# Patient Record
Sex: FEMALE | Race: WHITE
[De-identification: names, ages, dates, MRNs, and addresses within clinical notes are randomized per-mention and may not be internally consistent; named-entity substitution may affect disease eponyms.]

---

## 2017-06-01 ENCOUNTER — HOSPITAL ENCOUNTER (OUTPATIENT)
Dept: HOSPITAL 47 - LABWHC1 | Age: 77
Discharge: HOME | End: 2017-06-01
Payer: MEDICARE

## 2017-06-01 DIAGNOSIS — E78.00: Primary | ICD-10-CM

## 2017-06-01 DIAGNOSIS — R79.9: ICD-10-CM

## 2017-06-01 DIAGNOSIS — I10: ICD-10-CM

## 2017-06-01 DIAGNOSIS — E03.9: ICD-10-CM

## 2017-06-01 LAB
ALP SERPL-CCNC: 74 U/L (ref 38–126)
ALT SERPL-CCNC: 40 U/L (ref 9–52)
ANION GAP SERPL CALC-SCNC: 11 MMOL/L
AST SERPL-CCNC: 31 U/L (ref 14–36)
BASOPHILS # BLD AUTO: 0.1 K/UL (ref 0–0.2)
BASOPHILS NFR BLD AUTO: 1 %
BUN SERPL-SCNC: 19 MG/DL (ref 7–17)
CALCIUM SPEC-MCNC: 9.6 MG/DL (ref 8.4–10.2)
CH: 30.1
CHCM: 34.3
CHLORIDE SERPL-SCNC: 106 MMOL/L (ref 98–107)
CHOLEST SERPL-MCNC: 194 MG/DL (ref ?–200)
CO2 SERPL-SCNC: 27 MMOL/L (ref 22–30)
EOSINOPHIL # BLD AUTO: 0.2 K/UL (ref 0–0.7)
EOSINOPHIL NFR BLD AUTO: 4 %
ERYTHROCYTE [DISTWIDTH] IN BLOOD BY AUTOMATED COUNT: 4.67 M/UL (ref 3.8–5.4)
ERYTHROCYTE [DISTWIDTH] IN BLOOD: 13.8 % (ref 11.5–15.5)
GLUCOSE SERPL-MCNC: 100 MG/DL (ref 74–99)
HCT VFR BLD AUTO: 41.3 % (ref 34–46)
HDLC SERPL-MCNC: 66 MG/DL (ref 40–60)
HDW: 2.8
HEMOGLOBIN A1C: 6 % (ref 4.2–6.1)
HGB BLD-MCNC: 14 GM/DL (ref 11.4–16)
LUC NFR BLD AUTO: 4 %
LYMPHOCYTES # SPEC AUTO: 2.1 K/UL (ref 1–4.8)
LYMPHOCYTES NFR SPEC AUTO: 31 %
MCH RBC QN AUTO: 29.9 PG (ref 25–35)
MCHC RBC AUTO-ENTMCNC: 33.9 G/DL (ref 31–37)
MCV RBC AUTO: 88.3 FL (ref 80–100)
MONOCYTES # BLD AUTO: 0.5 K/UL (ref 0–1)
MONOCYTES NFR BLD AUTO: 8 %
NEUTROPHILS # BLD AUTO: 3.6 K/UL (ref 1.3–7.7)
NEUTROPHILS NFR BLD AUTO: 53 %
NON-AFRICAN AMERICAN GFR(MDRD): >60
POTASSIUM SERPL-SCNC: 4 MMOL/L (ref 3.5–5.1)
PROT SERPL-MCNC: 7.2 G/DL (ref 6.3–8.2)
SODIUM SERPL-SCNC: 144 MMOL/L (ref 137–145)
TRIGL SERPL-MCNC: 209 MG/DL (ref ?–150)
WBC # BLD AUTO: 0.26 10*3/UL
WBC # BLD AUTO: 6.8 K/UL (ref 3.8–10.6)
WBC (PEROX): 7.21

## 2017-06-01 PROCEDURE — 80053 COMPREHEN METABOLIC PANEL: CPT

## 2017-06-01 PROCEDURE — 84439 ASSAY OF FREE THYROXINE: CPT

## 2017-06-01 PROCEDURE — 85025 COMPLETE CBC W/AUTO DIFF WBC: CPT

## 2017-06-01 PROCEDURE — 80061 LIPID PANEL: CPT

## 2017-06-01 PROCEDURE — 36415 COLL VENOUS BLD VENIPUNCTURE: CPT

## 2017-06-01 PROCEDURE — 84443 ASSAY THYROID STIM HORMONE: CPT

## 2017-06-01 PROCEDURE — 83036 HEMOGLOBIN GLYCOSYLATED A1C: CPT

## 2017-11-07 ENCOUNTER — HOSPITAL ENCOUNTER (OUTPATIENT)
Dept: HOSPITAL 47 - LABWHC1 | Age: 77
Discharge: HOME | End: 2017-11-07
Payer: MEDICARE

## 2017-11-07 DIAGNOSIS — E03.9: ICD-10-CM

## 2017-11-07 DIAGNOSIS — E78.00: ICD-10-CM

## 2017-11-07 DIAGNOSIS — R79.9: ICD-10-CM

## 2017-11-07 DIAGNOSIS — I10: Primary | ICD-10-CM

## 2017-11-07 LAB
ALP SERPL-CCNC: 95 U/L (ref 38–126)
ALT SERPL-CCNC: 52 U/L (ref 9–52)
ANION GAP SERPL CALC-SCNC: 11 MMOL/L
AST SERPL-CCNC: 35 U/L (ref 14–36)
BASOPHILS # BLD AUTO: 0.1 K/UL (ref 0–0.2)
BASOPHILS NFR BLD AUTO: 1 %
BUN SERPL-SCNC: 18 MG/DL (ref 7–17)
CALCIUM SPEC-MCNC: 9.9 MG/DL (ref 8.4–10.2)
CH: 29.1
CHCM: 32.3
CHLORIDE SERPL-SCNC: 106 MMOL/L (ref 98–107)
CHOLEST SERPL-MCNC: 261 MG/DL (ref ?–200)
CO2 SERPL-SCNC: 26 MMOL/L (ref 22–30)
EOSINOPHIL # BLD AUTO: 0.3 K/UL (ref 0–0.7)
EOSINOPHIL NFR BLD AUTO: 4 %
ERYTHROCYTE [DISTWIDTH] IN BLOOD BY AUTOMATED COUNT: 5.05 M/UL (ref 3.8–5.4)
ERYTHROCYTE [DISTWIDTH] IN BLOOD: 14.7 % (ref 11.5–15.5)
GLUCOSE SERPL-MCNC: 113 MG/DL (ref 74–99)
HCT VFR BLD AUTO: 45.8 % (ref 34–46)
HDLC SERPL-MCNC: 66 MG/DL (ref 40–60)
HDW: 2.53
HGB BLD-MCNC: 14.8 GM/DL (ref 11.4–16)
LUC NFR BLD AUTO: 4 %
LYMPHOCYTES # SPEC AUTO: 2.5 K/UL (ref 1–4.8)
LYMPHOCYTES NFR SPEC AUTO: 31 %
MCH RBC QN AUTO: 29.3 PG (ref 25–35)
MCHC RBC AUTO-ENTMCNC: 32.3 G/DL (ref 31–37)
MCV RBC AUTO: 90.7 FL (ref 80–100)
MONOCYTES # BLD AUTO: 0.7 K/UL (ref 0–1)
MONOCYTES NFR BLD AUTO: 9 %
NEUTROPHILS # BLD AUTO: 4.3 K/UL (ref 1.3–7.7)
NEUTROPHILS NFR BLD AUTO: 52 %
NON-AFRICAN AMERICAN GFR(MDRD): >60
POTASSIUM SERPL-SCNC: 4.2 MMOL/L (ref 3.5–5.1)
PROT SERPL-MCNC: 7.7 G/DL (ref 6.3–8.2)
SODIUM SERPL-SCNC: 143 MMOL/L (ref 137–145)
WBC # BLD AUTO: 0.31 10*3/UL
WBC # BLD AUTO: 8.2 K/UL (ref 3.8–10.6)
WBC (PEROX): 7.87

## 2017-11-07 PROCEDURE — 80053 COMPREHEN METABOLIC PANEL: CPT

## 2017-11-07 PROCEDURE — 83036 HEMOGLOBIN GLYCOSYLATED A1C: CPT

## 2017-11-07 PROCEDURE — 84439 ASSAY OF FREE THYROXINE: CPT

## 2017-11-07 PROCEDURE — 84443 ASSAY THYROID STIM HORMONE: CPT

## 2017-11-07 PROCEDURE — 36415 COLL VENOUS BLD VENIPUNCTURE: CPT

## 2017-11-07 PROCEDURE — 85025 COMPLETE CBC W/AUTO DIFF WBC: CPT

## 2017-11-07 PROCEDURE — 80061 LIPID PANEL: CPT

## 2018-05-09 ENCOUNTER — HOSPITAL ENCOUNTER (OUTPATIENT)
Dept: HOSPITAL 47 - LABWHC1 | Age: 78
Discharge: HOME | End: 2018-05-09
Payer: MEDICARE

## 2018-05-09 DIAGNOSIS — R79.9: ICD-10-CM

## 2018-05-09 DIAGNOSIS — E78.00: Primary | ICD-10-CM

## 2018-05-09 DIAGNOSIS — E03.9: ICD-10-CM

## 2018-05-09 LAB
ALBUMIN SERPL-MCNC: 5 G/DL (ref 3.5–5)
ALP SERPL-CCNC: 79 U/L (ref 38–126)
ALT SERPL-CCNC: 36 U/L (ref 9–52)
ANION GAP SERPL CALC-SCNC: 16 MMOL/L
AST SERPL-CCNC: 29 U/L (ref 14–36)
BASOPHILS # BLD AUTO: 0.1 K/UL (ref 0–0.2)
BASOPHILS NFR BLD AUTO: 1 %
BUN SERPL-SCNC: 18 MG/DL (ref 7–17)
CALCIUM SPEC-MCNC: 10 MG/DL (ref 8.4–10.2)
CHLORIDE SERPL-SCNC: 104 MMOL/L (ref 98–107)
CHOLEST SERPL-MCNC: 225 MG/DL (ref ?–200)
CO2 SERPL-SCNC: 26 MMOL/L (ref 22–30)
EOSINOPHIL # BLD AUTO: 0.3 K/UL (ref 0–0.7)
EOSINOPHIL NFR BLD AUTO: 4 %
ERYTHROCYTE [DISTWIDTH] IN BLOOD BY AUTOMATED COUNT: 4.98 M/UL (ref 3.8–5.4)
ERYTHROCYTE [DISTWIDTH] IN BLOOD: 13.7 % (ref 11.5–15.5)
GLUCOSE SERPL-MCNC: 102 MG/DL (ref 74–99)
HBA1C MFR BLD: 6 % (ref 4–6)
HCT VFR BLD AUTO: 43.3 % (ref 34–46)
HDLC SERPL-MCNC: 71 MG/DL (ref 40–60)
HGB BLD-MCNC: 14.5 GM/DL (ref 11.4–16)
LDLC SERPL CALC-MCNC: 132 MG/DL (ref 0–99)
LYMPHOCYTES # SPEC AUTO: 2 K/UL (ref 1–4.8)
LYMPHOCYTES NFR SPEC AUTO: 23 %
MCH RBC QN AUTO: 29.1 PG (ref 25–35)
MCHC RBC AUTO-ENTMCNC: 33.5 G/DL (ref 31–37)
MCV RBC AUTO: 86.9 FL (ref 80–100)
MONOCYTES # BLD AUTO: 0.7 K/UL (ref 0–1)
MONOCYTES NFR BLD AUTO: 8 %
NEUTROPHILS # BLD AUTO: 5.4 K/UL (ref 1.3–7.7)
NEUTROPHILS NFR BLD AUTO: 62 %
PLATELET # BLD AUTO: 284 K/UL (ref 150–450)
POTASSIUM SERPL-SCNC: 4.6 MMOL/L (ref 3.5–5.1)
PROT SERPL-MCNC: 7.6 G/DL (ref 6.3–8.2)
SODIUM SERPL-SCNC: 146 MMOL/L (ref 137–145)
T4 FREE SERPL-MCNC: 1 NG/DL (ref 0.78–2.19)
TRIGL SERPL-MCNC: 111 MG/DL (ref ?–150)
WBC # BLD AUTO: 8.7 K/UL (ref 3.8–10.6)

## 2018-05-09 PROCEDURE — 80053 COMPREHEN METABOLIC PANEL: CPT

## 2018-05-09 PROCEDURE — 84439 ASSAY OF FREE THYROXINE: CPT

## 2018-05-09 PROCEDURE — 84443 ASSAY THYROID STIM HORMONE: CPT

## 2018-05-09 PROCEDURE — 83036 HEMOGLOBIN GLYCOSYLATED A1C: CPT

## 2018-05-09 PROCEDURE — 85025 COMPLETE CBC W/AUTO DIFF WBC: CPT

## 2018-05-09 PROCEDURE — 80061 LIPID PANEL: CPT

## 2018-05-09 PROCEDURE — 36415 COLL VENOUS BLD VENIPUNCTURE: CPT

## 2018-06-07 ENCOUNTER — HOSPITAL ENCOUNTER (OUTPATIENT)
Dept: HOSPITAL 47 - RADMAMWWP | Age: 78
Discharge: HOME | End: 2018-06-07
Payer: MEDICARE

## 2018-06-07 DIAGNOSIS — Z12.31: Primary | ICD-10-CM

## 2018-06-07 PROCEDURE — 77063 BREAST TOMOSYNTHESIS BI: CPT

## 2018-06-07 PROCEDURE — 77067 SCR MAMMO BI INCL CAD: CPT

## 2018-06-11 NOTE — MM
Reason for exam: screening  (asymptomatic).

Last mammogram was performed 1 year and 6 months ago.



History:

Patient is postmenopausal and has history of other cancer at age 62.

Took estrogen for 16 years beginning at age 47.  Took progesterone for 16 years 

beginning at age 47.



Physical Findings:

A clinical breast exam by your physician is recommended on an annual basis and 

results should be correlated with mammographic findings.



MG 3D Screening Mammo W/Cad

Bilateral CC and MLO view(s) were taken.

Prior study comparison: December 16, 2016, bilateral MG 3d screening mammo w/cad. 

June 2, 2015, bilateral MG screening mammo w CAD.

There are scattered fibroglandular densities.  No suspicious abnormality.  No 

significant changes when compared with prior studies.





ASSESSMENT: Negative, BI-RAD 1



RECOMMENDATION:

Routine screening mammogram of both breasts in 1 year.

## 2019-09-19 ENCOUNTER — HOSPITAL ENCOUNTER (OUTPATIENT)
Dept: HOSPITAL 47 - RADMAMWWP | Age: 79
Discharge: HOME | End: 2019-09-19
Attending: INTERNAL MEDICINE
Payer: MEDICARE

## 2019-09-19 DIAGNOSIS — Z78.0: ICD-10-CM

## 2019-09-19 DIAGNOSIS — Z12.31: Primary | ICD-10-CM

## 2019-09-19 PROCEDURE — 77067 SCR MAMMO BI INCL CAD: CPT

## 2019-09-19 PROCEDURE — 77063 BREAST TOMOSYNTHESIS BI: CPT

## 2019-09-19 PROCEDURE — 77080 DXA BONE DENSITY AXIAL: CPT

## 2019-09-20 NOTE — BD
EXAMINATION TYPE: Axial Bone Density

 

DATE OF EXAM: 9/19/2019

 

COMPARISON: NONE

 

CLINICAL HISTORY:

 

Height:  5 FT 3 IN

Weight:  140

 

FRAX RISK QUESTIONS:

 

History of Fracture in Adulthood: YES

Secondary Osteoporosis:

    RISK FACTORS 

HISTORY OF: 

Active: YES

 

Postmenopausal woman: TOTAL HYST AGE 52

Take estrogen and/or progesterone medications: TOOK FROM AGE [age]

 

MEDICATIONS: 

Thyroid Medications:  YES

Which medication: LEVOTHYROXINE

How Long: HAS TAKEN SINCE HER 20'S

Additional Medications: AMLODIPINE, DONEPEZIL, LEVOTHYROXINE ,ATORVASTATIN, CLONIDINE, 

 

 

Additional History:

 

 

EXAM MEASUREMENTS: 

Bone mineral densitometry was performed using the [employer].

Bone mineral density as measured about the Lumbar spine is:  

----- L1-L4(G/cm2): 1.321

T Score Values are as follows:

----- L2: 0.7

----- L3: 1.7

----- L4: 2.1

----- L1-L4: 1.2

Bone mineral density has: DECREASED  -2.8 % since study of: 2016

 

Bone mineral density about the R hip (g/cm2): 0.954

Bone mineral density about the L hip (g/cm2): 0.967

T Score values are as follows:

-----R Neck: -0.6

-----L Neck: -0.5

-----R Total: 0.1

-----L Total: 0.6

Bone mineral density has: DECREASED  -2.0 % since study of: 2016

 

 

IMPRESSION:

Normal (Values between +1 and -1 indicate normal bone mass).  Consider repeating this study in 5 year
s or sooner if there is some new clinical indication.

 

 

 

 

 

NOTE:  T-SCORE=SD OF THE YOUNG ADULT MEAN.

## 2019-09-23 NOTE — MM
Reason for exam: screening  (asymptomatic).

Last mammogram was performed 1 year and 3 months ago.



History:

Patient is postmenopausal and has history of other cancer at age 62.

Took estrogen for 16 years beginning at age 47.  Took progesterone for 16 years 

beginning at age 47.



Physical Findings:

A clinical breast exam by your physician is recommended on an annual basis and 

results should be correlated with mammographic findings.



MG 3D Screening Mammo W/Cad

Bilateral CC and MLO view(s) were taken.

Prior study comparison: June 7, 2018, bilateral MG 3d screening mammo w/cad.  

December 16, 2016, bilateral MG 3d screening mammo w/cad.

There are scattered fibroglandular densities.  No significant changes when 

compared with prior studies.





ASSESSMENT: Benign, BI-RAD 2



RECOMMENDATION:

Routine screening mammogram of both breasts in 1 year.

## 2020-07-02 ENCOUNTER — HOSPITAL ENCOUNTER (INPATIENT)
Dept: HOSPITAL 47 - EC | Age: 80
LOS: 1 days | Discharge: HOME | DRG: 605 | End: 2020-07-03
Payer: COMMERCIAL

## 2020-07-02 DIAGNOSIS — Z90.49: ICD-10-CM

## 2020-07-02 DIAGNOSIS — Z90.710: ICD-10-CM

## 2020-07-02 DIAGNOSIS — Z81.8: ICD-10-CM

## 2020-07-02 DIAGNOSIS — Z79.890: ICD-10-CM

## 2020-07-02 DIAGNOSIS — Z11.59: ICD-10-CM

## 2020-07-02 DIAGNOSIS — F03.90: ICD-10-CM

## 2020-07-02 DIAGNOSIS — R26.81: ICD-10-CM
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
## 2020-07-02 DIAGNOSIS — Z60.2: ICD-10-CM
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
## 2020-07-02 DIAGNOSIS — D62: ICD-10-CM

## 2020-07-02 DIAGNOSIS — I10: ICD-10-CM

## 2020-07-02 DIAGNOSIS — Z98.890: ICD-10-CM

## 2020-07-02 DIAGNOSIS — Z79.899: ICD-10-CM

## 2020-07-02 DIAGNOSIS — E78.5: ICD-10-CM

## 2020-07-02 DIAGNOSIS — S20.01XA: Primary | ICD-10-CM

## 2020-07-02 LAB
ALBUMIN SERPL-MCNC: 4.4 G/DL (ref 3.5–5)
ALP SERPL-CCNC: 82 U/L (ref 38–126)
ALT SERPL-CCNC: 50 U/L (ref 4–34)
ANION GAP SERPL CALC-SCNC: 10 MMOL/L
AST SERPL-CCNC: 61 U/L (ref 14–36)
BASOPHILS # BLD AUTO: 0.1 K/UL (ref 0–0.2)
BASOPHILS NFR BLD AUTO: 1 %
BUN SERPL-SCNC: 16 MG/DL (ref 7–17)
CALCIUM SPEC-MCNC: 10.2 MG/DL (ref 8.4–10.2)
CHLORIDE SERPL-SCNC: 106 MMOL/L (ref 98–107)
CO2 SERPL-SCNC: 21 MMOL/L (ref 22–30)
EOSINOPHIL # BLD AUTO: 0.1 K/UL (ref 0–0.7)
EOSINOPHIL NFR BLD AUTO: 2 %
ERYTHROCYTE [DISTWIDTH] IN BLOOD BY AUTOMATED COUNT: 4.58 M/UL (ref 3.8–5.4)
ERYTHROCYTE [DISTWIDTH] IN BLOOD: 13.9 % (ref 11.5–15.5)
GLUCOSE SERPL-MCNC: 144 MG/DL (ref 74–99)
HCT VFR BLD AUTO: 40.7 % (ref 34–46)
HGB BLD-MCNC: 13.4 GM/DL (ref 11.4–16)
LYMPHOCYTES # SPEC AUTO: 1.2 K/UL (ref 1–4.8)
LYMPHOCYTES NFR SPEC AUTO: 14 %
MCH RBC QN AUTO: 29.2 PG (ref 25–35)
MCHC RBC AUTO-ENTMCNC: 32.8 G/DL (ref 31–37)
MCV RBC AUTO: 88.8 FL (ref 80–100)
MONOCYTES # BLD AUTO: 0.6 K/UL (ref 0–1)
MONOCYTES NFR BLD AUTO: 7 %
NEUTROPHILS # BLD AUTO: 6.8 K/UL (ref 1.3–7.7)
NEUTROPHILS NFR BLD AUTO: 76 %
PLATELET # BLD AUTO: 230 K/UL (ref 150–450)
POTASSIUM SERPL-SCNC: 4 MMOL/L (ref 3.5–5.1)
PROT SERPL-MCNC: 7.1 G/DL (ref 6.3–8.2)
SODIUM SERPL-SCNC: 137 MMOL/L (ref 137–145)
WBC # BLD AUTO: 9 K/UL (ref 3.8–10.6)

## 2020-07-02 PROCEDURE — 90715 TDAP VACCINE 7 YRS/> IM: CPT

## 2020-07-02 PROCEDURE — 71260 CT THORAX DX C+: CPT

## 2020-07-02 PROCEDURE — 36415 COLL VENOUS BLD VENIPUNCTURE: CPT

## 2020-07-02 PROCEDURE — 85025 COMPLETE CBC W/AUTO DIFF WBC: CPT

## 2020-07-02 PROCEDURE — 99285 EMERGENCY DEPT VISIT HI MDM: CPT

## 2020-07-02 PROCEDURE — 84484 ASSAY OF TROPONIN QUANT: CPT

## 2020-07-02 PROCEDURE — 70450 CT HEAD/BRAIN W/O DYE: CPT

## 2020-07-02 PROCEDURE — 90471 IMMUNIZATION ADMIN: CPT

## 2020-07-02 PROCEDURE — 93005 ELECTROCARDIOGRAM TRACING: CPT

## 2020-07-02 PROCEDURE — 74177 CT ABD & PELVIS W/CONTRAST: CPT

## 2020-07-02 PROCEDURE — 96374 THER/PROPH/DIAG INJ IV PUSH: CPT

## 2020-07-02 PROCEDURE — 80053 COMPREHEN METABOLIC PANEL: CPT

## 2020-07-02 PROCEDURE — 72125 CT NECK SPINE W/O DYE: CPT

## 2020-07-02 RX ADMIN — HYDROCODONE BITARTRATE AND ACETAMINOPHEN PRN EACH: 5; 325 TABLET ORAL at 19:41

## 2020-07-02 SDOH — SOCIAL STABILITY - SOCIAL INSECURITY: PROBLEMS RELATED TO LIVING ALONE: Z60.2
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
## 2020-07-02 NOTE — XR
Left knee

 

HISTORY: Trauma and pain

 

4 views the left knee

 

Osteoarthritic changes are present, marginal spurring is present in the medial compartment with joint
 space loss greater than at the patellofemoral joint. There is soft tissue swelling. No evident joint
 effusion. Alignment, bone mineralization are maintained. Vascular calcifications are noted incidenta
lly.

 

IMPRESSION: Soft tissue swelling. Osteophytes. No fracture or dislocation.
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
## 2020-07-02 NOTE — XR
Left forearm and left wrist

 

HISTORY: Trauma and pain

 

2 views of the left, 3 views the left wrist

 

Osteoarthritic changes are present especially at the carpometacarpal joint, first digit of the left h
and. Bone mineralization is reduced. Alignment is maintained. Intravenous catheter present at the ant
ecubital region.

 

IMPRESSION: No fracture or dislocation of the left wrist or forearm.
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
## 2020-07-03 VITALS — DIASTOLIC BLOOD PRESSURE: 52 MMHG | SYSTOLIC BLOOD PRESSURE: 127 MMHG | HEART RATE: 117 BPM | TEMPERATURE: 98.1 F

## 2020-07-03 VITALS — RESPIRATION RATE: 16 BRPM

## 2020-07-03 LAB
ALBUMIN SERPL-MCNC: 3.5 G/DL (ref 3.5–5)
ALP SERPL-CCNC: 57 U/L (ref 38–126)
ALT SERPL-CCNC: 38 U/L (ref 4–34)
ANION GAP SERPL CALC-SCNC: 5 MMOL/L
AST SERPL-CCNC: 40 U/L (ref 14–36)
BASOPHILS # BLD AUTO: 0 K/UL (ref 0–0.2)
BASOPHILS NFR BLD AUTO: 1 %
BUN SERPL-SCNC: 16 MG/DL (ref 7–17)
CALCIUM SPEC-MCNC: 8.9 MG/DL (ref 8.4–10.2)
CHLORIDE SERPL-SCNC: 107 MMOL/L (ref 98–107)
CO2 SERPL-SCNC: 24 MMOL/L (ref 22–30)
EOSINOPHIL # BLD AUTO: 0.1 K/UL (ref 0–0.7)
EOSINOPHIL NFR BLD AUTO: 1 %
ERYTHROCYTE [DISTWIDTH] IN BLOOD BY AUTOMATED COUNT: 3.42 M/UL (ref 3.8–5.4)
ERYTHROCYTE [DISTWIDTH] IN BLOOD: 14.1 % (ref 11.5–15.5)
GLUCOSE SERPL-MCNC: 110 MG/DL (ref 74–99)
HCT VFR BLD AUTO: 30.8 % (ref 34–46)
HGB BLD-MCNC: 10 GM/DL (ref 11.4–16)
LYMPHOCYTES # SPEC AUTO: 1.7 K/UL (ref 1–4.8)
LYMPHOCYTES NFR SPEC AUTO: 21 %
MCH RBC QN AUTO: 29.2 PG (ref 25–35)
MCHC RBC AUTO-ENTMCNC: 32.4 G/DL (ref 31–37)
MCV RBC AUTO: 90.1 FL (ref 80–100)
MONOCYTES # BLD AUTO: 0.9 K/UL (ref 0–1)
MONOCYTES NFR BLD AUTO: 11 %
NEUTROPHILS # BLD AUTO: 5 K/UL (ref 1.3–7.7)
NEUTROPHILS NFR BLD AUTO: 64 %
PLATELET # BLD AUTO: 190 K/UL (ref 150–450)
POTASSIUM SERPL-SCNC: 4.5 MMOL/L (ref 3.5–5.1)
PROT SERPL-MCNC: 5.9 G/DL (ref 6.3–8.2)
SODIUM SERPL-SCNC: 136 MMOL/L (ref 137–145)
WBC # BLD AUTO: 7.9 K/UL (ref 3.8–10.6)

## 2020-07-03 RX ADMIN — HYDROCODONE BITARTRATE AND ACETAMINOPHEN PRN EACH: 5; 325 TABLET ORAL at 03:07
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
## 2020-09-04 ENCOUNTER — HOSPITAL ENCOUNTER (OUTPATIENT)
Dept: HOSPITAL 47 - WWCWWP | Age: 80
Discharge: HOME | End: 2020-09-04
Attending: SURGERY
Payer: COMMERCIAL

## 2020-09-04 VITALS
DIASTOLIC BLOOD PRESSURE: 65 MMHG | TEMPERATURE: 98.4 F | SYSTOLIC BLOOD PRESSURE: 120 MMHG | RESPIRATION RATE: 16 BRPM | HEART RATE: 82 BPM

## 2020-09-04 DIAGNOSIS — Z53.9: Primary | ICD-10-CM
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
## 2020-09-08 ENCOUNTER — HOSPITAL ENCOUNTER (OUTPATIENT)
Dept: HOSPITAL 47 - OR | Age: 80
Discharge: HOME | End: 2020-09-08
Attending: SURGERY
Payer: MEDICARE

## 2020-09-08 VITALS — SYSTOLIC BLOOD PRESSURE: 155 MMHG | DIASTOLIC BLOOD PRESSURE: 69 MMHG | HEART RATE: 82 BPM

## 2020-09-08 VITALS — TEMPERATURE: 98.1 F

## 2020-09-08 VITALS — BODY MASS INDEX: 23.2 KG/M2

## 2020-09-08 VITALS — RESPIRATION RATE: 16 BRPM

## 2020-09-08 DIAGNOSIS — E07.9: ICD-10-CM

## 2020-09-08 DIAGNOSIS — Z79.890: ICD-10-CM

## 2020-09-08 DIAGNOSIS — F03.90: ICD-10-CM

## 2020-09-08 DIAGNOSIS — I10: ICD-10-CM

## 2020-09-08 DIAGNOSIS — Z79.899: ICD-10-CM
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
## 2020-09-08 DIAGNOSIS — Z90.49: ICD-10-CM

## 2020-09-08 DIAGNOSIS — Z98.890: ICD-10-CM

## 2020-09-08 DIAGNOSIS — Z79.82: ICD-10-CM

## 2020-09-08 DIAGNOSIS — S20.01XA: Primary | ICD-10-CM

## 2020-09-08 PROCEDURE — 87070 CULTURE OTHR SPECIMN AEROBIC: CPT

## 2020-09-08 PROCEDURE — 10140 I&D HMTMA SEROMA/FLUID COLLJ: CPT

## 2020-09-08 PROCEDURE — 87075 CULTR BACTERIA EXCEPT BLOOD: CPT

## 2020-09-08 PROCEDURE — 87205 SMEAR GRAM STAIN: CPT
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
## 2020-09-08 NOTE — P.DS
Providers


Attending physician: 


Allyson Hurst





Primary care physician: 


Kit Patrick








Plan - Discharge Summary


Discharge Rx Participant: No


New Discharge Prescriptions: 


No Action


   Melatonin 3 mg PO HS


   Multivit-Min/Iron/Folic/Lutein [Centrum Silver Women Tablet] 1 tab PO QAM


   Loratadine 10 mg PO QAM


   Ubidecarenone [Co Q-10] 200 mg PO QAM


   Glucosam/Vaibhav-Msm1/C/Gonzales/Bosw [Glucosamine-Chondroitin-MSM Tb] 1 tab PO BID


   Calcium Carbonate/Vitamin D3 [Calcium 600-Vit D3 400 Tablet] 1 tab PO QAM


   Donepezil [Aricept] 10 mg PO HS


   cloNIDine HCL [Catapres] 0.1 mg PO HS


   Levothyroxine Sodium [Synthroid] 50 mcg PO DAILY@0300


   Atorvastatin [Lipitor] 20 mg PO HS


   Lisinopril/Hydrochlorothiazide [Zestoretic 20-12.5] 1 tab PO QAM


   Vitamin B Complex 1 each PO BID


   Aspirin [Adult Low Dose Aspirin EC] 81 mg PO HS


   Omega-3 Fatty Acids [Omega-3] 1,600 mg PO BID


Discharge Medication List





Atorvastatin [Lipitor] 20 mg PO HS 07/02/20 [History]


Calcium Carbonate/Vitamin D3 [Calcium 600-Vit D3 400 Tablet] 1 tab PO QAM 

07/02/20 [History]


Donepezil [Aricept] 10 mg PO HS 07/02/20 [History]


Glucosam/Vaibhav-Msm1/C/Gonzales/Bosw [Glucosamine-Chondroitin-MSM Tb] 1 tab PO BID 

07/02/20 [History]


Levothyroxine Sodium [Synthroid] 50 mcg PO DAILY@0300 07/02/20 [History]


Loratadine 10 mg PO QAM 07/02/20 [History]


Melatonin 3 mg PO HS 07/02/20 [History]


Multivit-Min/Iron/Folic/Lutein [Centrum Silver Women Tablet] 1 tab PO QAM 

07/02/20 [History]


Ubidecarenone [Co Q-10] 200 mg PO QAM 07/02/20 [History]


cloNIDine HCL [Catapres] 0.1 mg PO HS 07/02/20 [History]


Aspirin [Adult Low Dose Aspirin EC] 81 mg PO HS 09/04/20 [History]


Lisinopril/Hydrochlorothiazide [Zestoretic 20-12.5] 1 tab PO QAM 09/04/20 

[History]


Omega-3 Fatty Acids [Omega-3] 1,600 mg PO BID 09/04/20 [History]


Vitamin B Complex 1 each PO BID 09/04/20 [History]








Follow up Appointment(s)/Referral(s): 


Allyson Hurst MD [STAFF PHYSICIAN] - 1 Week


Activity/Diet/Wound Care/Special Instructions: 


change wound packing Q day; daughtr in law is a nurse and will change packing; 

teach wound care


may shower after 48 hours


do not drive


Discharge Disposition: HOME SELF-CARE


Plan of Treatment: 


daughter in law is  nurse and will change packing

## 2020-09-17 ENCOUNTER — HOSPITAL ENCOUNTER (OUTPATIENT)
Dept: HOSPITAL 47 - WWCWWP | Age: 80
Discharge: HOME | End: 2020-09-17
Attending: SURGERY
Payer: MEDICARE

## 2020-09-17 VITALS
SYSTOLIC BLOOD PRESSURE: 152 MMHG | HEART RATE: 84 BPM | TEMPERATURE: 98.4 F | DIASTOLIC BLOOD PRESSURE: 88 MMHG | RESPIRATION RATE: 20 BRPM

## 2020-09-17 DIAGNOSIS — Z53.9: Primary | ICD-10-CM
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
## 2020-10-23 ENCOUNTER — HOSPITAL ENCOUNTER (OUTPATIENT)
Dept: HOSPITAL 47 - WWCWWP | Age: 80
Discharge: HOME | End: 2020-10-23
Attending: SURGERY
Payer: MEDICARE

## 2020-10-23 VITALS
DIASTOLIC BLOOD PRESSURE: 76 MMHG | SYSTOLIC BLOOD PRESSURE: 146 MMHG | HEART RATE: 91 BPM | RESPIRATION RATE: 16 BRPM | TEMPERATURE: 98.8 F

## 2020-10-23 DIAGNOSIS — Z53.9: Primary | ICD-10-CM

## 2020-11-20 ENCOUNTER — HOSPITAL ENCOUNTER (OUTPATIENT)
Dept: HOSPITAL 47 - WWCWWP | Age: 80
Discharge: HOME | End: 2020-11-20
Attending: SURGERY
Payer: COMMERCIAL

## 2020-11-20 VITALS
SYSTOLIC BLOOD PRESSURE: 156 MMHG | TEMPERATURE: 98.5 F | HEART RATE: 67 BPM | RESPIRATION RATE: 20 BRPM | DIASTOLIC BLOOD PRESSURE: 112 MMHG

## 2020-11-20 DIAGNOSIS — Z53.9: Primary | ICD-10-CM
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
## 2021-01-07 ENCOUNTER — HOSPITAL ENCOUNTER (OUTPATIENT)
Dept: HOSPITAL 47 - WWCWWP | Age: 81
Discharge: HOME | End: 2021-01-07
Attending: SURGERY
Payer: MEDICARE

## 2021-01-07 VITALS
SYSTOLIC BLOOD PRESSURE: 131 MMHG | RESPIRATION RATE: 18 BRPM | TEMPERATURE: 98.5 F | HEART RATE: 77 BPM | DIASTOLIC BLOOD PRESSURE: 77 MMHG

## 2021-01-07 DIAGNOSIS — Z53.9: Primary | ICD-10-CM
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
## 2021-01-11 ENCOUNTER — HOSPITAL ENCOUNTER (OUTPATIENT)
Dept: HOSPITAL 47 - RADMAMWWP | Age: 81
Discharge: HOME | End: 2021-01-11
Attending: SURGERY
Payer: MEDICARE

## 2021-01-11 DIAGNOSIS — R92.8: Primary | ICD-10-CM

## 2021-01-11 PROCEDURE — 76642 ULTRASOUND BREAST LIMITED: CPT

## 2021-01-11 PROCEDURE — 77066 DX MAMMO INCL CAD BI: CPT

## 2021-01-11 PROCEDURE — 77062 BREAST TOMOSYNTHESIS BI: CPT

## 2021-01-11 NOTE — USB
Reason for exam: additional evaluation requested from abnormal screening.



History:

Patient is postmenopausal and has history of other cancer at age 62.

Took estrogen for 16 years beginning at age 47.  Took progesterone for 16 years 

beginning at age 47.



US Breast Limited RT

Right limited breast ultrasound including focal area of concern, retroareolar and 

axilla demonstrates a 5 x 5 x 5mm oval, hypoechoic lesion with thru transmission 

at 2 o'clock possible complicated cyst, 6 month follow up recommended and a 7 x 6 

x 7mm cystic, benign oil cyst at 9 o'clock BB. Areas of scarring and shadowing. 

Multiple cystic area visualized. Scanned 1-5 o'clock and 9 o'clock.



These results were verbally communicated with the patient and result sheet given 

to the patient on 1/11/21.





ASSESSMENT: Probably benign, BI-RAD 3



RECOMMENDATION:

Follow-up diagnostic mammogram and ultrasound of the right breast in 6 months.

(2 o'clock)

## 2021-01-11 NOTE — MM
Reason for exam: additional evaluation requested from prior study.

Last mammogram was performed 1 year and 4 months ago.



History:

Patient is postmenopausal and has history of other cancer at age 62.

Took estrogen for 16 years beginning at age 47.  Took progesterone for 16 years 

beginning at age 47.



Physical Findings:

Nurse Summary: 1 x 1.5cm nodule in the right breast at 9 o'clock (nurse ts).



MG 3D Diag Mammo W/Cad ASIA

Bilateral CC and MLO view(s) were taken.

Prior study comparison: September 19, 2019, bilateral MG 3d screening mammo w/cad.

June 7, 2018, bilateral MG 3d screening mammo w/cad.

The breast tissue is heterogeneously dense. This may lower the sensitivity of 

mammography.  Palpable marker right breast 9 o'clock near the nipple with 

underlying 7mm oil cyst. Subtle 8mm central MLO nodularity not seen on CC. Dense 

tissues medial right breast with deformity compatible with patient's extensive 

post traumatic and post surgical increased density.



These results were verbally communicated with the patient and result sheet given 

to the patient on 1/11/21.





ASSESSMENT: Incomplete: need additional imaging evaluation, BI-RAD 0



RECOMMENDATION:

Ultrasound of the right breast.

(9 o'clock and 1-5 o'clock)

## 2021-02-12 ENCOUNTER — HOSPITAL ENCOUNTER (OUTPATIENT)
Dept: HOSPITAL 47 - WWCWWP | Age: 81
Discharge: HOME | End: 2021-02-12
Attending: SURGERY
Payer: MEDICARE

## 2021-02-12 VITALS
RESPIRATION RATE: 18 BRPM | DIASTOLIC BLOOD PRESSURE: 70 MMHG | SYSTOLIC BLOOD PRESSURE: 149 MMHG | HEART RATE: 64 BPM | TEMPERATURE: 97.7 F

## 2021-02-12 DIAGNOSIS — Z53.9: Primary | ICD-10-CM

## 2021-02-12 NOTE — P.PN
Progress Note - Text


Progress Note Date: 02/12/21


post evacuation of a hematoma of breast


Jami is a 80 -year-old white female status post evacuation of hematoma from 

right breast 9-8-20.  She is doing well with no evidence of infection.  Cultures

did not reveal any organisms of concern.


  The area has not been able to be packed Since before her last visit on 1721.

She has no complaints of pain.  She had [place] 

[address].  The breast tissue was noted to be heterogeneously dense.  Dense 

tissues in the medial breast with deformity were noted in compatible with 

patient's extensive post traumatic postsurgical hematoma.  An ultrasound was 

performed of the right breast.  No lesions of concern were noted in the left 

breast.  Ultrasound was of the 9:00 1 to 5 o'clock position.  The patient on 

ultrasound was noted to have a 5 mm hypoechoic lesion with transmission at the 2

o'clock position possibly complicated cyst, 6 month follow-up recommended and a 

7 x 6 cystic lesion at 9:00.  Areas of scarring and shadowing were noted.  The 

areas were this was probably benign follow-up diagnostic mammogram and 

ultrasound of the right breast in 6 months.





Physical exam:


Lungs: Clear


Heart: Regular rate and rhythm


Breast examination:


Inspection: Changes in the right breast related to prior hematoma and surgery no

open areas in the breast at this time


Palpation:


Right breast: Approximately 6 x 5 cm area of firmness extending diagonally from 

the areolar on the lower inner right breast.  This corresponds to an area under 

darker discoloration related to prior hematoma evacuation this is believed to be

post traumatic changes.  No other dominant masses or nodules of concern 

identified.


Right axilla: No adenopathy of concern


Left breast: Multiple positional exam fibrocystic changes


Left axilla: No adenopathy of concern


no evidence of infection





Impression:


Patient doing well status post evacuation of hematoma right breast


Plan:


1. Patient is going to have right breast mammogram and ultrasound in 6 months 

with physician exam at that time


2.  Patient will call sooner if any questions or concerns





Patient was seen with daughter-in-law Jaylin on the telephone correspondence was 

had with her during the visit.  She was brought in by her grandson.

## 2021-03-19 ENCOUNTER — HOSPITAL ENCOUNTER (OUTPATIENT)
Dept: HOSPITAL 47 - LABWHC1 | Age: 81
Discharge: HOME | End: 2021-03-19
Attending: NURSE PRACTITIONER
Payer: MEDICARE

## 2021-03-19 DIAGNOSIS — I10: ICD-10-CM

## 2021-03-19 DIAGNOSIS — E78.5: Primary | ICD-10-CM

## 2021-03-19 DIAGNOSIS — E03.9: ICD-10-CM

## 2021-03-19 DIAGNOSIS — F33.9: ICD-10-CM

## 2021-03-19 LAB
ANION GAP SERPL CALC-SCNC: 10.3 MMOL/L (ref 4–12)
BASOPHILS # BLD AUTO: 0.09 X 10*3/UL (ref 0–0.1)
BASOPHILS NFR BLD AUTO: 1.1 %
BUN SERPL-SCNC: 20 MG/DL (ref 9–27)
BUN/CREAT SERPL: 28.57 RATIO (ref 12–20)
CALCIUM SPEC-MCNC: 10.2 MG/DL (ref 8.7–10.3)
CHLORIDE SERPL-SCNC: 104 MMOL/L (ref 96–109)
CHOLEST SERPL-MCNC: 170 MG/DL (ref 0–200)
CO2 SERPL-SCNC: 25.7 MMOL/L (ref 21.6–31.8)
EOSINOPHIL # BLD AUTO: 0.16 X 10*3/UL (ref 0.04–0.35)
EOSINOPHIL NFR BLD AUTO: 2 %
ERYTHROCYTE [DISTWIDTH] IN BLOOD BY AUTOMATED COUNT: 4.35 X 10*6/UL (ref 4.1–5.2)
ERYTHROCYTE [DISTWIDTH] IN BLOOD: 12.7 % (ref 11.5–14.5)
GLUCOSE SERPL-MCNC: 96 MG/DL (ref 70–110)
HCT VFR BLD AUTO: 40 % (ref 37.2–46.3)
HDLC SERPL-MCNC: 69 MG/DL (ref 40–60)
HGB BLD-MCNC: 13 G/DL (ref 12–15)
LDLC SERPL CALC-MCNC: 77.6 MG/DL (ref 0–131)
LYMPHOCYTES # SPEC AUTO: 1.27 X 10*3/UL (ref 0.9–5)
LYMPHOCYTES NFR SPEC AUTO: 16 %
MCH RBC QN AUTO: 29.9 PG (ref 27–32)
MCHC RBC AUTO-ENTMCNC: 32.5 G/DL (ref 32–37)
MCV RBC AUTO: 92 FL (ref 80–97)
MONOCYTES # BLD AUTO: 1.2 X 10*3/UL (ref 0.2–1)
MONOCYTES NFR BLD AUTO: 15.1 %
NEUTROPHILS # BLD AUTO: 5.18 X 10*3/UL (ref 1.8–7.7)
NEUTROPHILS NFR BLD AUTO: 65.4 %
PLATELET # BLD AUTO: 243 X 10*3/UL (ref 140–440)
POTASSIUM SERPL-SCNC: 3.8 MMOL/L (ref 3.5–5.5)
SODIUM SERPL-SCNC: 140 MMOL/L (ref 135–145)
TRIGL SERPL-MCNC: 117 MG/DL (ref 0–149)
VLDLC SERPL CALC-MCNC: 23.4 MG/DL (ref 5–40)
WBC # BLD AUTO: 7.93 X 10*3/UL (ref 4.5–10)

## 2021-03-19 PROCEDURE — 84443 ASSAY THYROID STIM HORMONE: CPT

## 2021-03-19 PROCEDURE — 82306 VITAMIN D 25 HYDROXY: CPT

## 2021-03-19 PROCEDURE — 36415 COLL VENOUS BLD VENIPUNCTURE: CPT

## 2021-03-19 PROCEDURE — 85025 COMPLETE CBC W/AUTO DIFF WBC: CPT

## 2021-03-19 PROCEDURE — 80061 LIPID PANEL: CPT

## 2021-03-19 PROCEDURE — 80048 BASIC METABOLIC PNL TOTAL CA: CPT

## 2021-08-06 ENCOUNTER — HOSPITAL ENCOUNTER (OUTPATIENT)
Dept: HOSPITAL 47 - RADMAMWWP | Age: 81
Discharge: HOME | End: 2021-08-06
Attending: SURGERY
Payer: MEDICARE

## 2021-08-06 DIAGNOSIS — Z79.818: ICD-10-CM

## 2021-08-06 DIAGNOSIS — Z85.9: ICD-10-CM

## 2021-08-06 DIAGNOSIS — R92.2: Primary | ICD-10-CM

## 2021-08-06 DIAGNOSIS — Z78.0: ICD-10-CM

## 2021-08-06 PROCEDURE — 77061 BREAST TOMOSYNTHESIS UNI: CPT

## 2021-08-06 PROCEDURE — 77065 DX MAMMO INCL CAD UNI: CPT

## 2021-08-06 PROCEDURE — 76642 ULTRASOUND BREAST LIMITED: CPT

## 2021-08-10 NOTE — USB
Reason for exam: follow-up at short interval from prior study.



History:

Patient is postmenopausal and has history of other cancer at age 62.

Took estrogen for 16 years beginning at age 47.  Took progesterone for 16 years 

beginning at age 47.



US Breast Limited RT

Technologist: Garbiella Link

Right limited breast ultrasound including focal area of concern, retroareolar and 

axilla demonstrates a 0.3 x 0.3 x 0.2cm circular, cystic lesion at 1 o'clock, a 

0.8 x 0.7cm cystic lesion at 2 o'clock, a 0.5 x 0.4 x 0.3cm cystic lesion with 

debris at 2 o'clock, a 0.7 x 0.6 x 0.5cm cystic lesion at the nipple and a 0.5 x 

0.5 x 0.5cm circular, solid, hypoechoic, stable lesion at 2 o'clock.



These results were verbally communicated with the patient and result sheet given 

to the patient on 8/6/21.





ASSESSMENT: Probably benign, BI-RAD 3



RECOMMENDATION:

Follow-up diagnostic mammogram of both breasts in 5 months.

Back on schedule for January 2022.

## 2021-08-10 NOTE — MM
Reason for exam: follow-up at short interval from prior study.

Last mammogram was performed 7 months ago.



History:

Patient is postmenopausal and has history of other cancer at age 62.

Took estrogen for 16 years beginning at age 47.  Took progesterone for 16 years 

beginning at age 47.



Physical Findings:

Nurse did not find any significant physical abnormalities on exam.



MG 3D Diag Mammo W/Cad RT

CC and MLO view(s) were taken of the right breast.

Prior study comparison: January 11, 2021, bilateral MG 3d diag mammo w/cad [facility].  

September 19, 2019, bilateral MG 3d screening mammo w/cad.

The breast tissue is heterogeneously dense. This may lower the sensitivity of 

mammography.  Post surgical changes.

No significant new findings when compared with previous films.



These results were verbally communicated with the patient and result sheet given 

to the patient on 8/6/21.





ASSESSMENT: Benign, BI-RAD 2



RECOMMENDATION:

Routine screening mammogram of both breasts in 5 months.

Back on schedule for January 2022.